# Patient Record
Sex: FEMALE | Race: WHITE | NOT HISPANIC OR LATINO | URBAN - METROPOLITAN AREA
[De-identification: names, ages, dates, MRNs, and addresses within clinical notes are randomized per-mention and may not be internally consistent; named-entity substitution may affect disease eponyms.]

---

## 2019-01-02 ENCOUNTER — OUTPATIENT (OUTPATIENT)
Dept: OUTPATIENT SERVICES | Facility: HOSPITAL | Age: 34
LOS: 1 days | End: 2019-01-02
Payer: COMMERCIAL

## 2019-01-02 DIAGNOSIS — O26.899 OTHER SPECIFIED PREGNANCY RELATED CONDITIONS, UNSPECIFIED TRIMESTER: ICD-10-CM

## 2019-01-02 DIAGNOSIS — Z3A.00 WEEKS OF GESTATION OF PREGNANCY NOT SPECIFIED: ICD-10-CM

## 2019-01-02 PROCEDURE — 99214 OFFICE O/P EST MOD 30 MIN: CPT

## 2019-01-13 ENCOUNTER — OUTPATIENT (OUTPATIENT)
Dept: OUTPATIENT SERVICES | Facility: HOSPITAL | Age: 34
LOS: 1 days | End: 2019-01-13
Payer: COMMERCIAL

## 2019-01-13 DIAGNOSIS — Z3A.00 WEEKS OF GESTATION OF PREGNANCY NOT SPECIFIED: ICD-10-CM

## 2019-01-13 DIAGNOSIS — O26.899 OTHER SPECIFIED PREGNANCY RELATED CONDITIONS, UNSPECIFIED TRIMESTER: ICD-10-CM

## 2019-01-13 PROCEDURE — 59025 FETAL NON-STRESS TEST: CPT

## 2019-01-13 PROCEDURE — 99214 OFFICE O/P EST MOD 30 MIN: CPT

## 2019-01-18 DIAGNOSIS — O09.613 SUPERVISION OF YOUNG PRIMIGRAVIDA, THIRD TRIMESTER: ICD-10-CM

## 2019-02-14 PROBLEM — Z00.00 ENCOUNTER FOR PREVENTIVE HEALTH EXAMINATION: Status: ACTIVE | Noted: 2019-02-14

## 2021-03-02 ENCOUNTER — EMERGENCY (EMERGENCY)
Facility: HOSPITAL | Age: 36
LOS: 1 days | Discharge: HOME | End: 2021-03-02
Admitting: EMERGENCY MEDICINE
Payer: COMMERCIAL

## 2021-03-02 ENCOUNTER — TRANSCRIPTION ENCOUNTER (OUTPATIENT)
Age: 36
End: 2021-03-02

## 2021-03-02 VITALS
OXYGEN SATURATION: 98 % | TEMPERATURE: 98 F | HEART RATE: 82 BPM | DIASTOLIC BLOOD PRESSURE: 82 MMHG | SYSTOLIC BLOOD PRESSURE: 133 MMHG | RESPIRATION RATE: 16 BRPM

## 2021-03-02 DIAGNOSIS — Z20.822 CONTACT WITH AND (SUSPECTED) EXPOSURE TO COVID-19: ICD-10-CM

## 2021-03-02 LAB — SARS-COV-2 RNA SPEC QL NAA+PROBE: SIGNIFICANT CHANGE UP

## 2021-03-02 PROCEDURE — U0003: CPT

## 2021-03-02 PROCEDURE — 99283 EMERGENCY DEPT VISIT LOW MDM: CPT

## 2021-03-02 PROCEDURE — 99282 EMERGENCY DEPT VISIT SF MDM: CPT

## 2021-03-02 PROCEDURE — U0005: CPT

## 2021-03-02 NOTE — ED PROVIDER NOTE - OBJECTIVE STATEMENT
Patient is presenting to the Emergency Department with a request to have COVID-19 testing.  Denies symptoms, including cough, shortness of breath, fever, chills, bodyaches or sore throat. Pt had exposure 5 days ago.

## 2021-03-02 NOTE — ED PROVIDER NOTE - PATIENT PORTAL LINK FT
You can access the FollowMyHealth Patient Portal offered by [facility] by registering at the following website: [email or web address]. By joining [employer]’s FollowMyHealth portal, you will also be able to view your health information using other applications (apps) compatible with our system.

## 2021-03-07 ENCOUNTER — EMERGENCY (EMERGENCY)
Facility: HOSPITAL | Age: 36
LOS: 1 days | Discharge: HOME | End: 2021-03-07
Admitting: EMERGENCY MEDICINE
Payer: COMMERCIAL

## 2021-03-07 VITALS
OXYGEN SATURATION: 96 % | HEART RATE: 75 BPM | TEMPERATURE: 98 F | RESPIRATION RATE: 16 BRPM | SYSTOLIC BLOOD PRESSURE: 137 MMHG | DIASTOLIC BLOOD PRESSURE: 87 MMHG

## 2021-03-07 DIAGNOSIS — Z20.822 CONTACT WITH AND (SUSPECTED) EXPOSURE TO COVID-19: ICD-10-CM

## 2021-03-07 LAB — SARS-COV-2 RNA SPEC QL NAA+PROBE: SIGNIFICANT CHANGE UP

## 2021-03-07 PROCEDURE — 99282 EMERGENCY DEPT VISIT SF MDM: CPT

## 2021-03-07 PROCEDURE — U0003: CPT

## 2021-03-07 PROCEDURE — 99283 EMERGENCY DEPT VISIT LOW MDM: CPT

## 2021-03-07 PROCEDURE — U0005: CPT

## 2021-03-07 NOTE — ED PROVIDER NOTE - NS ED ROS FT
CONSTITUTIONAL:  No fever, no chills, no sweats, no body aches  HEENT:  No sore throat, ear pain, headache, runny nose or sinus congestion  PULM:  No cough, shortness of breath or wheezing  GI:  No abdominal pain, vomiting or diarrhea

## 2021-03-07 NOTE — ED PROVIDER NOTE - IV ALTEPLASE ADMIN OUTSIDE HIDDEN
Significant recent/past 24 hr events: HD on , tolerated it well.    Subjective:    Review of Systems       Unable to obtain due to: intubated & sedated         PAST MEDICAL & SURGICAL HISTORY:  Pneumomediastinum    Umbilical hernia  Reduciable    Osteoarthritis of both knees, unspecified osteoarthritis type    AARON (Obstructive Sleep Apnea)  category II pt uses c/pap pt to bering to hospital/  OR booking notified    Pneumonia      GERD (Gastroesophageal Reflux Disease)    Asthma  diagnosed   on adbvair denies attacks    DVT (Deep Venous Thrombosis)  left leg 6 m s/p knee replacement in     HTN (Hypertension)    H/O ileostomy  Ileostomy Revesal     S/P LEEP      S/P Exploratory Laparotomy    peritonitis  s/p right knee replacement/ colon resection and ileostomy    S/P Colonoscopy      S/P Endoscopy   gerd    S/P  Section      History of Tubal Ligation  s/p c/section     S/P Knee Surgery  2010      FAMILY HISTORY:  Family history of gastric cancer    Family history of breast cancer (Grandparent)        Vitals   ICU Vital Signs Last 24 Hrs  T(C): 36.2 (18 Dec 2020 07:00), Max: 36.3 (17 Dec 2020 13:55)  T(F): 97.1 (18 Dec 2020 07:00), Max: 97.4 (17 Dec 2020 13:55)  HR: 54 (18 Dec 2020 11:33) (52 - 101)  ABP: 125/72 (18 Dec 2020 11:00) (90/37 - 179/63)  ABP(mean): 90 (18 Dec 2020 11:00) (51 - 122)  RR: 7 (18 Dec 2020 11:00) (7 - 32)  SpO2: 96% (18 Dec 2020 11:33) (91% - 100%)      Physical Exam:   Constitutional: ill appearing, no acute distress   HEENT: + PERRLA, EOMI, no drainage or redness  Neck: supple,  No JVD  Respiratory: Ventilator assisted breath rhonchi bilaterally to auscultation, no accessory muscle use noted  Cardiovascular: Regular rate, regular rhythm, normal S1, S2; no murmurs or rub  Gastrointestinal: obese, soft, non-tender, non distended, no hepatosplenomegaly, normal bowel sounds  Extremities: + 2 peripheral edema, no cyanosis, no clubbing   Vascular: Equal and normal pulses: 2+ peripheral pulses throughout  Neurological: sedated/intubated  Skin: DTI to chin area, warm, dry, well perfused, no rashes    VENT SETTINGS   Mode: AC/ CMV (Assist Control/ Continuous Mandatory Ventilation)  RR (machine): 32  TV (machine): 370  FiO2: 40  PEEP: 8  ITime: 0.59  MAP: 22  PIP: 50    ABG - ( 18 Dec 2020 02:43 )  pH, Arterial: 7.40  pH, Blood: x     /  pCO2: 41    /  pO2: 84    / HCO3: 25    / Base Excess: 0.6   /  SaO2: 96.5                I&O's Detail    17 Dec 2020 07:01  -  18 Dec 2020 07:00  --------------------------------------------------------  IN:    Bumetanide: 240 mL    Dexmedetomidine: 790.5 mL    Enteral Tube Flush: 80 mL    FentaNYL: 146.9 mL    FentaNYL: 41.3 mL    Heparin: 170 mL    Heparin Infusion: 170 mL    IV PiggyBack: 200 mL    Nepro: 480 mL    Norepinephrine: 10.5 mL    Other (mL): 500 mL  Total IN: 2829.2 mL    OUT:    Indwelling Catheter - Urethral (mL): 2310 mL    Other (mL): 500 mL  Total OUT: 2810 mL    Total NET: 19.2 mL      18 Dec 2020 07:01  -  18 Dec 2020 13:52  --------------------------------------------------------  IN:    Dexmedetomidine: 1.2 mL    IV PiggyBack: 50 mL  Total IN: 51.2 mL    OUT:    Indwelling Catheter - Urethral (mL): 450 mL  Total OUT: 450 mL    Total NET: -398.8 mL          LABS                        8.2    8.82  )-----------( 142      ( 18 Dec 2020 02:43 )             27.5     12-18    131<L>  |  95<L>  |  41<H>  ----------------------------<  144<H>  3.0<L>   |  25  |  1.96<H>    Ca    8.1<L>      18 Dec 2020 09:38  Phos  3.3     12  Mg     1.4         TPro  5.6<L>  /  Alb  1.9<L>  /  TBili  0.9  /  DBili  x   /  AST  22  /  ALT  <5<L>  /  AlkPhos  125<H>      LIVER FUNCTIONS - ( 18 Dec 2020 09:38 )  Alb: 1.9 g/dL / Pro: 5.6 g/dL / ALK PHOS: 125 U/L / ALT: <5 U/L / AST: 22 U/L / GGT: x           PT/INR - ( 18 Dec 2020 02:43 )   PT: 15.0 sec;   INR: 1.32 ratio         PTT - ( 18 Dec 2020 02:43 )  PTT:85.0 sec          POCT Blood Glucose.: 179 mg/dL <H> (20 @ 06:28)  POCT Blood Glucose.: 126 mg/dL <H> (20 @ 23:52)  POCT Blood Glucose.: 145 mg/dL <H> (20 @ 18:20)        MEDICATIONS  (STANDING):  chlorhexidine 0.12% Liquid 15 milliLiter(s) Oral Mucosa every 12 hours  chlorhexidine 4% Liquid 1 Application(s) Topical <User Schedule>  dexMEDEtomidine Infusion 0.5 MICROgram(s)/kG/Hr (14.4 mL/Hr) IV Continuous <Continuous>  diazepam    Tablet 10 milliGRAM(s) Oral three times a day  fentaNYL   Infusion..... 0.5 MICROgram(s)/kG/Hr (1.15 mL/Hr) IV Continuous <Continuous>  heparin  Infusion 1700 Unit(s)/Hr (17 mL/Hr) IV Continuous <Continuous>  imipenem/cilastatin  IVPB 250 milliGRAM(s) IV Intermittent every 12 hours  insulin lispro (ADMELOG) corrective regimen sliding scale   SubCutaneous every 6 hours  insulin NPH human recombinant 10 Unit(s) SubCutaneous every 6 hours  methadone   Solution 20 milliGRAM(s) Oral three times a day  midodrine 30 milliGRAM(s) Oral every 8 hours  mupirocin 2% Ointment 1 Application(s) Both Nostrils two times a day  norepinephrine Infusion 0.1 MICROgram(s)/kG/Min (21.6 mL/Hr) IV Continuous <Continuous>  pantoprazole  Injectable 40 milliGRAM(s) IV Push daily  polyethylene glycol 3350 17 Gram(s) Oral daily  potassium chloride  10 mEq/100 mL IVPB 10 milliEquivalent(s) IV Intermittent every 1 hour  senna Syrup 15 milliLiter(s) Oral two times a day    MEDICATIONS  (PRN):      Allergies:  Kiwi (Unknown)  latex (Anaphylaxis)  latex (Unknown)  penicillin (Other)  penicillin (Unknown)  perfume  hives (Other)  potassium acetate (Other)  soap additives hives (Other)   show

## 2021-03-07 NOTE — ED PROVIDER NOTE - PATIENT PORTAL LINK FT
You can access the FollowMyHealth Patient Portal offered by [facility] by registering at the following website: [email or web address]. By joining [employer]’s FollowMyHealth portal, you will also be able to view your health information using other applications (apps) compatible with our system.

## 2025-04-28 NOTE — ED PROVIDER NOTE - INCLUDE COVID-19 DISCHARGE INSTRUCTIONS
[address]  [place] [address]  Tel.  [phone]  Fax. [phone] [address]  [place] [address]  Tel.  [phone]  Fax. [phone] [address] [address]  Tel.  [phone]  Fax. [phone]     Sleep Apnea: After Your Visit  Your Care Instructions  Sleep apnea occurs when you frequently stop breathing for 10 seconds or longer during sleep. It can be mild to severe, based on the number of times per hour that you stop breathing or have slowed breathing. Blocked or narrowed airways in your nose, mouth, or throat can cause sleep apnea. Your airway can become blocked when your throat muscles and tongue relax during sleep.  Sleep apnea is common, occurring in 1 out of 20 individuals.  Individuals having any of the following characteristics should be evaluated and treated right away due to high risk and detrimental consequences from untreated sleep apnea:  Obesity  Congestive Heart failure  Atrial Fibrillation  Uncontrolled Hypertension  Type II Diabetes  Night-time Arrhythmias  Stroke  Pulmonary Hypertension  High-risk Driving Populations (pilots, truck drivers, etc.)  Patients Considering Weight-loss Surgery    How do you know you have sleep apnea?  You probably have sleep apnea if you answer 'yes' to 3 or more of the following questions:  S - Have you been told that you Snore?   T - Are you often Tired during the day?  O - Has anyone Observed you stop breathing while sleeping?  P- Do you have (or are being treated for) high blood Pressure?    B - Are you obese (Body Mass Index > 35)?  A - Is your Age 50 years old or older?  N - Is your Neck size greater than 16 inches?  G - Are you male Gender?  A sleep physician can prescribe a breathing device that prevents tissues in the throat from blocking your airway. Or your doctor may recommend using a dental device (oral breathing device) to help keep your airway open. In some cases, surgery may  <-------- Click here to INCLUDE CoVID-19 Discharge Instructions

## 2025-07-07 NOTE — ED PROVIDER NOTE - IV ALTEPASE ADMIN HIDDEN
Last office visit 3/12/25.  Upcoming appt 8/12/25.  Last refill atenolol 3/12/25.  Rx eprescribed per protocol.     show